# Patient Record
Sex: MALE | URBAN - METROPOLITAN AREA
[De-identification: names, ages, dates, MRNs, and addresses within clinical notes are randomized per-mention and may not be internally consistent; named-entity substitution may affect disease eponyms.]

---

## 2017-02-15 ENCOUNTER — HOSPITAL ENCOUNTER (OUTPATIENT)
Dept: BEHAVIORAL HEALTH | Facility: CLINIC | Age: 37
End: 2017-02-15
Attending: PSYCHIATRY & NEUROLOGY
Payer: MEDICAID

## 2017-02-15 ENCOUNTER — OFFICE VISIT (OUTPATIENT)
Dept: INTERPRETER SERVICES | Facility: CLINIC | Age: 37
End: 2017-02-15

## 2017-02-15 VITALS
WEIGHT: 183.6 LBS | HEIGHT: 66 IN | BODY MASS INDEX: 29.51 KG/M2 | HEART RATE: 64 BPM | DIASTOLIC BLOOD PRESSURE: 97 MMHG | SYSTOLIC BLOOD PRESSURE: 145 MMHG

## 2017-02-15 PROBLEM — F19.20 CHEMICAL DEPENDENCY (H): Status: ACTIVE | Noted: 2017-02-15

## 2017-02-15 PROCEDURE — H2035 A/D TX PROGRAM, PER HOUR: HCPCS | Mod: HQ

## 2017-02-15 PROCEDURE — T1013 SIGN LANG/ORAL INTERPRETER: HCPCS | Mod: U3

## 2017-02-15 PROCEDURE — 10020000 ZZH LODGING PLUS FACILITY CHARGE ADULT

## 2017-02-15 PROCEDURE — 40000007 ZZH STATISTIC ADULT CD FACE TO FACE-NO CHRG

## 2017-02-15 RX ORDER — LORATADINE 10 MG/1
10 TABLET ORAL DAILY PRN
COMMUNITY
End: 2017-03-11

## 2017-02-15 RX ORDER — AMOXICILLIN 250 MG
2 CAPSULE ORAL
COMMUNITY
End: 2017-03-11

## 2017-02-15 RX ORDER — MAGNESIUM HYDROXIDE/ALUMINUM HYDROXICE/SIMETHICONE 120; 1200; 1200 MG/30ML; MG/30ML; MG/30ML
30 SUSPENSION ORAL EVERY 6 HOURS PRN
COMMUNITY
End: 2017-03-11

## 2017-02-15 ASSESSMENT — ANXIETY QUESTIONNAIRES
4. TROUBLE RELAXING: NEARLY EVERY DAY
1. FEELING NERVOUS, ANXIOUS, OR ON EDGE: MORE THAN HALF THE DAYS
6. BECOMING EASILY ANNOYED OR IRRITABLE: MORE THAN HALF THE DAYS
7. FEELING AFRAID AS IF SOMETHING AWFUL MIGHT HAPPEN: NEARLY EVERY DAY
5. BEING SO RESTLESS THAT IT IS HARD TO SIT STILL: MORE THAN HALF THE DAYS
GAD7 TOTAL SCORE: 17
3. WORRYING TOO MUCH ABOUT DIFFERENT THINGS: NEARLY EVERY DAY
2. NOT BEING ABLE TO STOP OR CONTROL WORRYING: MORE THAN HALF THE DAYS

## 2017-02-15 ASSESSMENT — PAIN SCALES - GENERAL: PAINLEVEL: MODERATE PAIN (4)
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
## 2017-02-15 NOTE — PROGRESS NOTES
[address] [address]          Dear Willian Payne,    This is to verify that Kalia Calero was admitted for treatment of chemical dependency at [place], [place] on 2/15/2017.    This individual is currently participating in:   ______ Inpatient   __x___ [facility] (Residential Non-Medical)   ______ Day Outpatient   ______ Evening Outpatient       The client will participate in the program for about four weeks, depending on the needs of the client.  Treatment is A.A. based and helps clients to achieve a chemically free lifestyle through lectures, individual, family and group therapy.  He has been assigned to Raheem Estes and Homer Banuelos  /  Telephone: [phone] as his primary counselor.  If you have further questions, please contact us.    Respectfully,      Katherin May MA [facility]  CD Evaluation Counselor        (faxed 2/15/2017)
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
## 2017-02-15 NOTE — PROGRESS NOTES
Initial Services Plan        Before your first treatment group, please do the following    Immediate health & safety concerns: Look for sober housing and a supportive social network.  Obtain personal items (glasses, hearing aides, medicines, diabetes supplies, clothing, etc.).  Look for a support network (such as AA, NA, DBT group, a Episcopalian group, etc.)  Repeate BP / Pluse, Check in 24 to 48 hours    Suggestions for client during the time between intake & completion of treatment plan:  Tour your treatment center (unit or outpatient clinic).  Introduce yourself to the treatment group.  Spend time getting to know your peers.  Complete your psycho-social paperwork.  Complete the problem list for your treatment plan.  Start drug and alcohol use history.  Review your patient or client handbook.  Identify concerns about whom to ask for family week    Client issues to be addressed in the first treatment sessions:  Identify concerns about coming into treatment, i.e. fear of failing again, sharing a room in treatment      NATALEE Thompson  2/15/2017  10:17 AM

## 2017-02-15 NOTE — PROGRESS NOTES
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 keep doing this over and over again.\"    2B. How often do you spend more time than you planned using or use more than you planned? (DSM)     \"too many times. By the time I realize it, it's too late.\"    3. How important is using to your social connections? Do many of your family or friends use?     Half of his friends use. His family does not use.    4A. Are you currently in a significant relationship?     No    4C. Sexual Orientation:     Bisexual    5A. Who do you live with?      NA    5B. Tell me about their alcohol/drug use and mental health issues.     NA    5C. Are you concerned for your safety there? No    5D. Are you concerned about the safety of anyone else who lives with you? No    6A. Do you have children who live with you?     No    6B. Do you have children who do not live with you?     No    7A. Who supports you in making changes in your alcohol or drug use? What are they willing to do to support you? Who is upset or angry about you making changes in your alcohol or drug use? How big a problem is this for you?      \"my neighbors, they live in the other apartment. They do care a lot about me.\"     7B. This table is provided to record information about the person s relationships and " "available support It is not necessary to ask each item; only to get a comprehensive picture of their support system.  How often can you count on the following people when you need someone?   Partner / Spouse N/A   Parent(s)/Aunt(s)/Uncle(s)/Grandparents Always supportive   Sibling(s)/Cousin(s) Always supportive   Child(abraham) N/A   Other relative(s) N/A   Friend(s)/neighbor(s) Always supportive   Child(abraham) s father(s)/mother(s) N/A   Support group member(s) N/A   Community of afia members N/A   /counselor/therapist/healer N/A   Other (specify) N/A     8A. What is your current living situation?     Pt has been in halfway since 9/30/2016.  Prior to halfway he was living alone in [place], [place].    8B. What is your long term plan for where you will be living?     He plans on returning to his apartment in [place].    8C. Tell me about your living environment/neighborhood? Ask enough follow up questions to determine safety, criminal activity, availability of alcohol and drugs, supportive or antagonistic to the person making changes.      Prior to halfway, \"easy access, but I don't want to look for it.  There will always be people wanting me to do bad things.\"    9. Criminal justice history: Gather current/recent history and any significant history related to substance use--Arrests? Convictions? Circumstances? Alcohol or drug involvement? Sentences? Still on probation or parole? Expectations of the court? Current court order? Any sex offenses - lifetime? What level? (DSM)    First degree drug possession charge. He has not been sentenced yet.  He is working with a .    10. What obstacles exist to participating in treatment? (Time off work, childcare, funding, transportation, pending halfway time, living situation)     None    Dimension VI Ratings   Recovery environment - The placing authority must use the criteria in Dimension VI to determine a client s recovery environment.   RISK DESCRIPTIONS - " "Severity rating: 3 Client is not engaged in structured, meaningful activity and the client s peers, family, significant other, and living environment are unsupportive, or there is significant criminal justice system involvement.    REASONS SEVERITY WAS ASSIGNED - (What support does the person have for making changes? What structure/stability does the person have in his or her daily life that will increase the likelihood that changes can be sustained? What problems exist in the person s environment that will jeopardize getting/staying clean and sober?)     The patient was in long term for the past four months.  Prior to long term he was living alone in an apartment that he plans on returning to after treatment.  He denies any drug use in his neighborhood, he would go to other places to use drugs. The patient reported having relationship conflict with his neighbor and her  because of his drug use. He stated, \"they would always tell me not to (use drugs).\" The patient identified as being bisexual and he denied being in a romantic relationship at this time.  The patient reported having a history of legal issues, including a current charge of 1st degree drug possession. He is not on probation yet. His next court date is next week, he doesn't think he needs to appear, only his  needs to be there.  The patient reported that most of his use of meth had been done alone during the summer of 2016.  He stated about 2 years ago, his meth use was more social.  The patient is unemployed and he last worked in 2009.  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  The patient reported having some increased financial stress due to not working or receiving disability.  The patient lacks a current sober peer support network.         Client Choice/Exceptions   Would you like services specific to language, age, gender, culture, Adventism preference, race, ethnicity, sexual orientation or disability?  Yes - Lao " "language. \"I do understand English, but can't always express it.\"    What particular treatment choices and options would you like to have? IP    Do you have a preference for a particular treatment program? [facility]    Criteria for Diagnosis     Criteria for Diagnosis  DSM-5 Criteria for Substance Use Disorder  Instructions: Determine whether the client currently meets the criteria for Substance Use Disorder using the diagnostic criteria in the DSM-V pp.[phone]. Current means during the most recent 12 months outside a facility that controls access to substances    Category of Substance Severity (ICD-10 Code / DSM 5 Code)     Alcohol Use Disorder Severe  (10.20) (303.90)   Cannabis Use Disorder NA   Hallucinogen Use Disorder NA   Inhalant Use Disorder NA   Opioid Use Disorder NA   Sedative, Hypnotic, or Anxiolytic Use Disorder NA   Stimulant Related Disorder Severe   (F15.20) (304.40) Amphetamine type substance   Tobacco Use Disorder Mild    (Z72.0) (305.1)   Other (or unknown) Substance Use Disorder NA       Collateral Contact Summary   Number of contacts made: 1    Contact with referring person:  NA.    If court related records were reviewed, summarize here: NA    Information from collateral contacts supported/largely agreed with information from the client and associated risk ratings.      Rule 25 Assessment Summary and Plan   's Recommendation    1)  Complete a residential based or similar treatment program, such as [facility].   2)  Abstain from all mood-altering chemicals unless prescribed by a licensed provider.   3)  Attend, at minimum, 2 weekly 12-step support group meetings.     4)  Actively work with a male sponsor on a weekly basis.   5)  Follow all the recommendations of your treatment/medical providers.  6)  Remain law abiding and follow all recommendations of the Courts/PO.  7)  Patient would benefit from individual psychotherapy to address his depression.        Collateral " Contacts     Name:    Yoanna Gold   Relationship:    CD Evaluator    Phone Number:    [phone] Releases:    Yes     The CD evaluation form was reviewed and the information contained in the CD evaluation form supported the patient's account of his chemical use history and chemical use consequences.    ollateral Contacts      A problematic pattern of alcohol/drug use leading to clinically significant impairment or distress, as manifested by at least two of the following, occurring within a 12-month period:    Alcohol/drug is often taken in larger amounts or over a longer period than was intended.  There is a persistent desire or unsuccessful efforts to cut down or control alcohol/drug use  A great deal of time is spent in activities necessary to obtain alcohol, use alcohol, or recover from its effects.  Craving, or a strong desire or urge to use alcohol/drug  Recurrent alcohol/drug use resulting in a failure to fulfill major role obligations at work, school or home.  Continued alcohol use despite having persistent or recurrent social or interpersonal problems caused or exacerbated by the effects of alcohol/drug.  Important social, occupational, or recreational activities are given up or reduced because of alcohol/drug use.  Recurrent alcohol/drug use in situations in which it is physically hazardous.  Alcohol/drug use is continued despite knowledge of having a persistent or recurrent physical or psychological problem that is likely to have been caused or exacerbated by alcohol.  Tolerance, as defined by either of the following: A need for markedly increased amounts of alcohol/drug to achieve intoxication or desired effect.  Withdrawal, as manifested by either of the following: The characteristic withdrawal syndrome for alcohol/drug (refer to Criteria A and B of the criteria set for alcohol/drug withdrawal).      Specify if: In early remission:  After full criteria for alcohol/drug use disorder were previously  met, none of the criteria for alcohol/drug use disorder have been met for at least 3 months but for less than 12 months (with the exception that Criterion A4,  Craving or a strong desire or urge to use alcohol/drug  may be met).     In sustained remission:   After full criteria for alcohol use disorder were previously met, non of the criteria for alcohol/drug use disorder have been met at any time during a period of 12 months or longer (with the exception that Criterion A4,  Craving or strong desire or urge to use alcohol/drug  may be met).   Specify if:   This additional specifier is used if the individual is in an environment where access to alcohol is restricted.    Mild: Presence of 2-3 symptoms    Moderate: Presence of 4-5 symptoms    Severe: Presence of 6 or more symptoms
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
## 2017-02-16 ENCOUNTER — HOSPITAL ENCOUNTER (OUTPATIENT)
Dept: BEHAVIORAL HEALTH | Facility: CLINIC | Age: 37
End: 2017-02-16
Attending: PSYCHIATRY & NEUROLOGY
Payer: MEDICAID

## 2017-02-16 PROCEDURE — T1013 SIGN LANG/ORAL INTERPRETER: HCPCS | Mod: U3

## 2017-02-16 PROCEDURE — H2035 A/D TX PROGRAM, PER HOUR: HCPCS | Mod: HQ

## 2017-02-16 PROCEDURE — 10020000 ZZH LODGING PLUS FACILITY CHARGE ADULT

## 2017-02-16 ASSESSMENT — ANXIETY QUESTIONNAIRES: GAD7 TOTAL SCORE: 17

## 2017-02-16 ASSESSMENT — PATIENT HEALTH QUESTIONNAIRE - PHQ9: SUM OF ALL RESPONSES TO PHQ QUESTIONS 1-9: 17

## 2017-02-16 NOTE — PROGRESS NOTES
Intro  D) Pt introduced himself in group and stated that he decided to come to tx at this time because he has a problem with methamphetamines and he does not want his family to be ashamed of him. Pt also reported that he has legal issues that are motivating him to seek help. Pt stated that this was his first time in tx.   I) Staff facilitated group and both staff and fellow group members welcomed new pt.  A) Pt appears comfortable in the group setting and seemed willing to share about himself. Pt appears appropriate for this level of care at this time.   P) Meet with staff to develop tx plan.
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
## 2017-02-17 ENCOUNTER — HOSPITAL ENCOUNTER (OUTPATIENT)
Dept: BEHAVIORAL HEALTH | Facility: CLINIC | Age: 37
End: 2017-02-17
Attending: PSYCHIATRY & NEUROLOGY
Payer: MEDICAID

## 2017-02-17 ENCOUNTER — OFFICE VISIT (OUTPATIENT)
Dept: INTERPRETER SERVICES | Facility: CLINIC | Age: 37
End: 2017-02-17

## 2017-02-17 PROCEDURE — T1013 SIGN LANG/ORAL INTERPRETER: HCPCS | Mod: U3

## 2017-02-17 PROCEDURE — 10020000 ZZH LODGING PLUS FACILITY CHARGE ADULT

## 2017-02-17 PROCEDURE — H2035 A/D TX PROGRAM, PER HOUR: HCPCS | Mod: HQ

## 2017-02-17 NOTE — PROGRESS NOTES
This writer left message for Radha Slaughter at the [facility] ([phone]) stating that pt will have supply of medication Celexa during his stay at .  Pt was billed for medication (16.25$) and County may cover the expense.  Have asked the [facility] to work with pt to cover the medication expense.  Counselors notified

## 2017-02-17 NOTE — PROGRESS NOTES
Dr. Saavedra gave TO for the following medication:    Citalopram Hydrobromide (Celexa PO). Take 20 mg by mouth daily. Dispense 30 day supply. No Refills    Order called into [facility] with [facility] at 8:25 am

## 2017-02-18 ENCOUNTER — HOSPITAL ENCOUNTER (OUTPATIENT)
Dept: BEHAVIORAL HEALTH | Facility: CLINIC | Age: 37
End: 2017-02-18
Attending: PSYCHIATRY & NEUROLOGY
Payer: MEDICAID

## 2017-02-18 PROCEDURE — 10020000 ZZH LODGING PLUS FACILITY CHARGE ADULT

## 2017-02-18 PROCEDURE — H2035 A/D TX PROGRAM, PER HOUR: HCPCS | Mod: HQ

## 2017-02-19 ENCOUNTER — HOSPITAL ENCOUNTER (OUTPATIENT)
Dept: BEHAVIORAL HEALTH | Facility: CLINIC | Age: 37
End: 2017-02-19
Attending: PSYCHIATRY & NEUROLOGY
Payer: MEDICAID

## 2017-02-19 PROCEDURE — 10020000 ZZH LODGING PLUS FACILITY CHARGE ADULT

## 2017-02-19 PROCEDURE — H2035 A/D TX PROGRAM, PER HOUR: HCPCS | Mod: HQ

## 2017-02-20 ENCOUNTER — OFFICE VISIT (OUTPATIENT)
Dept: INTERPRETER SERVICES | Facility: CLINIC | Age: 37
End: 2017-02-20

## 2017-02-20 ENCOUNTER — HOSPITAL ENCOUNTER (OUTPATIENT)
Dept: BEHAVIORAL HEALTH | Facility: CLINIC | Age: 37
End: 2017-02-20
Attending: PSYCHIATRY & NEUROLOGY
Payer: MEDICAID

## 2017-02-20 PROCEDURE — 10020000 ZZH LODGING PLUS FACILITY CHARGE ADULT

## 2017-02-20 PROCEDURE — T1013 SIGN LANG/ORAL INTERPRETER: HCPCS | Mod: U3

## 2017-02-20 PROCEDURE — H2035 A/D TX PROGRAM, PER HOUR: HCPCS | Mod: HQ

## 2017-02-20 NOTE — PROGRESS NOTES
Acknowledgement of Current Treatment Plan       I have reviewed my treatment plan with my therapist / counselor on 2/20/17. I agree with the plan as it is written in the electronic health record.    Name Signature   Kalia Calero    Name of Therapist / Counselor    Carolann [email or web address]

## 2017-02-21 ENCOUNTER — OFFICE VISIT (OUTPATIENT)
Dept: INTERPRETER SERVICES | Facility: CLINIC | Age: 37
End: 2017-02-21

## 2017-02-21 ENCOUNTER — HOSPITAL ENCOUNTER (OUTPATIENT)
Dept: BEHAVIORAL HEALTH | Facility: CLINIC | Age: 37
End: 2017-02-21
Attending: PSYCHIATRY & NEUROLOGY
Payer: MEDICAID

## 2017-02-21 PROCEDURE — T1013 SIGN LANG/ORAL INTERPRETER: HCPCS | Mod: U3

## 2017-02-21 PROCEDURE — H2035 A/D TX PROGRAM, PER HOUR: HCPCS | Mod: HQ

## 2017-02-21 PROCEDURE — 10020000 ZZH LODGING PLUS FACILITY CHARGE ADULT

## 2017-02-21 NOTE — PROGRESS NOTES
Acknowledgement of Current Treatment Plan       I have reviewed my treatment plan with my therapist / counselor on 2/21/2017  I agree with the plan as it is written in the electronic health record.    Name Signature   Kalia Calero    Name of Therapist / Counselor    NATALEE Matamoros

## 2017-02-21 NOTE — PROGRESS NOTES
2/21/2017     This writer created a new treatment plan for the patient because the plan previously created did not meet the patient's Citizen of [place] speaking/reading needs. The new plan has been presented to the patient with this writer and an . Pt verbalized understanding that he does not need to complete the previous plan, only the new one.     NATALEE Matamoros

## 2017-02-22 ENCOUNTER — OFFICE VISIT (OUTPATIENT)
Dept: INTERPRETER SERVICES | Facility: CLINIC | Age: 37
End: 2017-02-22

## 2017-02-22 ENCOUNTER — HOSPITAL ENCOUNTER (OUTPATIENT)
Dept: BEHAVIORAL HEALTH | Facility: CLINIC | Age: 37
End: 2017-02-22
Attending: PSYCHIATRY & NEUROLOGY
Payer: MEDICAID

## 2017-02-22 PROCEDURE — H2035 A/D TX PROGRAM, PER HOUR: HCPCS | Mod: HQ

## 2017-02-22 PROCEDURE — T1013 SIGN LANG/ORAL INTERPRETER: HCPCS | Mod: U3

## 2017-02-22 PROCEDURE — 10020000 ZZH LODGING PLUS FACILITY CHARGE ADULT
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
## 2017-02-23 ENCOUNTER — OFFICE VISIT (OUTPATIENT)
Dept: INTERPRETER SERVICES | Facility: CLINIC | Age: 37
End: 2017-02-23

## 2017-02-23 ENCOUNTER — HOSPITAL ENCOUNTER (OUTPATIENT)
Dept: BEHAVIORAL HEALTH | Facility: CLINIC | Age: 37
End: 2017-02-23
Attending: PSYCHIATRY & NEUROLOGY
Payer: MEDICAID

## 2017-02-23 PROCEDURE — T1013 SIGN LANG/ORAL INTERPRETER: HCPCS | Mod: U3

## 2017-02-23 PROCEDURE — H2035 A/D TX PROGRAM, PER HOUR: HCPCS | Mod: HQ

## 2017-02-23 PROCEDURE — 10020000 ZZH LODGING PLUS FACILITY CHARGE ADULT

## 2017-02-23 NOTE — PROGRESS NOTES
2/23/2017   D: Pt read an article on coping with stress and created a list of 10 ways to cope with stress. Pt shared this assignment during group therapy on 02/22/17.  I: Counselor facilitated group, interpreters translated, and pt's peers provided feedback.  A: Pt appeared to put thought into his assignment and was open for feedback.  P: Pt will continue to work on goals/assignments on his treatment plan.    NATALEE Matamoros

## 2017-02-24 ENCOUNTER — HOSPITAL ENCOUNTER (OUTPATIENT)
Dept: BEHAVIORAL HEALTH | Facility: CLINIC | Age: 37
End: 2017-02-24
Attending: PSYCHIATRY & NEUROLOGY
Payer: MEDICAID

## 2017-02-24 PROCEDURE — T1013 SIGN LANG/ORAL INTERPRETER: HCPCS | Mod: U3

## 2017-02-24 PROCEDURE — 10020000 ZZH LODGING PLUS FACILITY CHARGE ADULT

## 2017-02-24 PROCEDURE — H2035 A/D TX PROGRAM, PER HOUR: HCPCS | Mod: HQ

## 2017-02-25 ENCOUNTER — HOSPITAL ENCOUNTER (OUTPATIENT)
Dept: BEHAVIORAL HEALTH | Facility: CLINIC | Age: 37
End: 2017-02-25
Attending: PSYCHIATRY & NEUROLOGY
Payer: MEDICAID

## 2017-02-25 PROCEDURE — 10020000 ZZH LODGING PLUS FACILITY CHARGE ADULT

## 2017-02-25 PROCEDURE — H2035 A/D TX PROGRAM, PER HOUR: HCPCS | Mod: HQ

## 2017-02-26 ENCOUNTER — HOSPITAL ENCOUNTER (OUTPATIENT)
Dept: BEHAVIORAL HEALTH | Facility: CLINIC | Age: 37
End: 2017-02-26
Attending: PSYCHIATRY & NEUROLOGY
Payer: MEDICAID

## 2017-02-26 PROCEDURE — H2035 A/D TX PROGRAM, PER HOUR: HCPCS | Mod: HQ

## 2017-02-26 PROCEDURE — 10020000 ZZH LODGING PLUS FACILITY CHARGE ADULT

## 2017-02-26 PROCEDURE — T1013 SIGN LANG/ORAL INTERPRETER: HCPCS | Mod: U3

## 2017-02-27 ENCOUNTER — OFFICE VISIT (OUTPATIENT)
Dept: INTERPRETER SERVICES | Facility: CLINIC | Age: 37
End: 2017-02-27

## 2017-02-27 ENCOUNTER — HOSPITAL ENCOUNTER (OUTPATIENT)
Dept: BEHAVIORAL HEALTH | Facility: CLINIC | Age: 37
End: 2017-02-27
Attending: PSYCHIATRY & NEUROLOGY
Payer: MEDICAID

## 2017-02-27 PROCEDURE — H2035 A/D TX PROGRAM, PER HOUR: HCPCS | Mod: HQ

## 2017-02-27 PROCEDURE — T1013 SIGN LANG/ORAL INTERPRETER: HCPCS | Mod: U3

## 2017-02-27 PROCEDURE — 10020000 ZZH LODGING PLUS FACILITY CHARGE ADULT

## 2017-02-28 ENCOUNTER — OFFICE VISIT (OUTPATIENT)
Dept: INTERPRETER SERVICES | Facility: CLINIC | Age: 37
End: 2017-02-28

## 2017-02-28 ENCOUNTER — HOSPITAL ENCOUNTER (OUTPATIENT)
Dept: BEHAVIORAL HEALTH | Facility: CLINIC | Age: 37
End: 2017-02-28
Attending: PSYCHIATRY & NEUROLOGY
Payer: MEDICAID

## 2017-02-28 PROCEDURE — H2035 A/D TX PROGRAM, PER HOUR: HCPCS | Mod: HQ

## 2017-02-28 PROCEDURE — T1013 SIGN LANG/ORAL INTERPRETER: HCPCS | Mod: U3

## 2017-02-28 PROCEDURE — 10020000 ZZH LODGING PLUS FACILITY CHARGE ADULT

## 2017-02-28 NOTE — PROGRESS NOTES
"D-Patient completed his \"10 Things He Could do for Depression\" assignment and presented in group. Patient made a detailed list of ten distinct things he could do in order to either avoid depression or limit its power over him.  I-Group therapy-Counselor reviewed group norms, facilitated group discussion and conducted feedback session.  A-Patient appears to be very clear in the thinking and behavior he must employ in order to better manage and keep depression from dominating his life.  P-Continue with treatment and treatment plan objectives.  "

## 2017-03-01 ENCOUNTER — HOSPITAL ENCOUNTER (OUTPATIENT)
Dept: BEHAVIORAL HEALTH | Facility: CLINIC | Age: 37
End: 2017-03-01
Attending: PSYCHIATRY & NEUROLOGY
Payer: MEDICAID

## 2017-03-01 ENCOUNTER — OFFICE VISIT (OUTPATIENT)
Dept: INTERPRETER SERVICES | Facility: CLINIC | Age: 37
End: 2017-03-01

## 2017-03-01 PROCEDURE — 10020000 ZZH LODGING PLUS FACILITY CHARGE ADULT
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
## 2017-03-02 ENCOUNTER — OFFICE VISIT (OUTPATIENT)
Dept: INTERPRETER SERVICES | Facility: CLINIC | Age: 37
End: 2017-03-02

## 2017-03-02 ENCOUNTER — HOSPITAL ENCOUNTER (OUTPATIENT)
Dept: BEHAVIORAL HEALTH | Facility: CLINIC | Age: 37
End: 2017-03-02
Attending: PSYCHIATRY & NEUROLOGY
Payer: MEDICAID

## 2017-03-02 PROCEDURE — H2035 A/D TX PROGRAM, PER HOUR: HCPCS | Mod: HQ

## 2017-03-02 PROCEDURE — 10020000 ZZH LODGING PLUS FACILITY CHARGE ADULT

## 2017-03-02 NOTE — PROGRESS NOTES
3/2/2017   D: Pt completed an assignment where he had to write a poem about his hope for recovery. Pt shared this assignment during group therapy today.  I: Counselor facilitated group therapy and pt's peers provided feedback.  A: Pt wrote about the things he is grateful for, and his hopes for recovery. The tone of the poem was very positive.  P: Pt will continue to work on goals/assignments on his treatment plan.     NATALEE Matamoros

## 2017-03-03 ENCOUNTER — HOSPITAL ENCOUNTER (OUTPATIENT)
Dept: BEHAVIORAL HEALTH | Facility: CLINIC | Age: 37
End: 2017-03-03
Attending: PSYCHIATRY & NEUROLOGY
Payer: MEDICAID

## 2017-03-03 ENCOUNTER — OFFICE VISIT (OUTPATIENT)
Dept: INTERPRETER SERVICES | Facility: CLINIC | Age: 37
End: 2017-03-03

## 2017-03-03 PROCEDURE — 10020000 ZZH LODGING PLUS FACILITY CHARGE ADULT

## 2017-03-03 PROCEDURE — H2035 A/D TX PROGRAM, PER HOUR: HCPCS | Mod: HQ

## 2017-03-04 ENCOUNTER — HOSPITAL ENCOUNTER (OUTPATIENT)
Dept: BEHAVIORAL HEALTH | Facility: CLINIC | Age: 37
End: 2017-03-04
Attending: PSYCHIATRY & NEUROLOGY
Payer: MEDICAID

## 2017-03-04 PROCEDURE — 10020000 ZZH LODGING PLUS FACILITY CHARGE ADULT

## 2017-03-04 PROCEDURE — H2035 A/D TX PROGRAM, PER HOUR: HCPCS | Mod: HQ

## 2017-03-05 ENCOUNTER — HOSPITAL ENCOUNTER (OUTPATIENT)
Dept: BEHAVIORAL HEALTH | Facility: CLINIC | Age: 37
End: 2017-03-05
Attending: PSYCHIATRY & NEUROLOGY
Payer: MEDICAID

## 2017-03-05 PROCEDURE — 10020000 ZZH LODGING PLUS FACILITY CHARGE ADULT

## 2017-03-05 PROCEDURE — H2035 A/D TX PROGRAM, PER HOUR: HCPCS | Mod: HQ

## 2017-03-06 ENCOUNTER — OFFICE VISIT (OUTPATIENT)
Dept: INTERPRETER SERVICES | Facility: CLINIC | Age: 37
End: 2017-03-06

## 2017-03-06 ENCOUNTER — HOSPITAL ENCOUNTER (OUTPATIENT)
Dept: BEHAVIORAL HEALTH | Facility: CLINIC | Age: 37
End: 2017-03-06
Attending: PSYCHIATRY & NEUROLOGY
Payer: MEDICAID

## 2017-03-06 PROCEDURE — 10020000 ZZH LODGING PLUS FACILITY CHARGE ADULT

## 2017-03-06 PROCEDURE — H2035 A/D TX PROGRAM, PER HOUR: HCPCS | Mod: HQ

## 2017-03-06 NOTE — PROGRESS NOTES
"D-Patient completed his \"15 People I Need Things From to Stay Sober\" and presented in group. Patient discussed who the 15 people where and what specifically he needs from each of them in order to help him live a life of sobriety.  I-Group therapy-Counselor reviewed group norms, facilitated group discussion and feedback session with peers.  A-Patient appears to have a greater level of awareness regarding the resources he has in his life that will help him stay sober.  P-Continue with treatment plan and treatment plan objectives.  "

## 2017-03-07 ENCOUNTER — OFFICE VISIT (OUTPATIENT)
Dept: INTERPRETER SERVICES | Facility: CLINIC | Age: 37
End: 2017-03-07

## 2017-03-07 ENCOUNTER — HOSPITAL ENCOUNTER (OUTPATIENT)
Dept: BEHAVIORAL HEALTH | Facility: CLINIC | Age: 37
End: 2017-03-07
Attending: PSYCHIATRY & NEUROLOGY
Payer: MEDICAID

## 2017-03-07 PROCEDURE — 10020000 ZZH LODGING PLUS FACILITY CHARGE ADULT

## 2017-03-07 PROCEDURE — H2035 A/D TX PROGRAM, PER HOUR: HCPCS | Mod: HQ

## 2017-03-08 ENCOUNTER — HOSPITAL ENCOUNTER (OUTPATIENT)
Dept: BEHAVIORAL HEALTH | Facility: CLINIC | Age: 37
End: 2017-03-08
Attending: PSYCHIATRY & NEUROLOGY
Payer: MEDICAID

## 2017-03-08 PROCEDURE — H2035 A/D TX PROGRAM, PER HOUR: HCPCS | Mod: HQ

## 2017-03-08 PROCEDURE — 10020000 ZZH LODGING PLUS FACILITY CHARGE ADULT

## 2017-03-08 NOTE — PROGRESS NOTES
"Patient:  Kalia Calero            Adult CD Progress Note and Treatment Plan Review     Attendance  Please refer to OP BEH CD Adult Attendance Record Documentation Flowsheet    Support group attended this week: Yes    Reporting sobriety: Yes    Treatment Plan     Treatment Plan Review competed on: 3/8/17      Client preferred learning style: Hands On    Staff Members contributing: NATALEE Matamoros, Emili Humphrey, NATALEE, Carolann Canales, ADC-T                      Received Supervision: No    Client: Contributed to goals and plans/    Client received copy of plan/revised plan: Yes    Client agrees with plan/revised plan: Yes    Changes to Treatment Plan: No    New Goals added since last review: None    Goals worked on since last review:  Depression, Relapse Prevention, Recovery, How bad was it    Strategies effective: Yes    Strategies need these changes: None    ASAM Risk Rating:    Dimension 1-0 Patient reports his last use date as August, 2016. Patient reports no current withdrawal symptomology.    Dimension 2-1 Patient reports no current biomedical issues.    Dimension 3-2 Patient reports a depression and anxiety mental health diagnosis. Patient completed his \"Readings on Depression\" assignment and presented in group. Patient reports no suicidal ideation at this time.    Dimension 4-1 Patient completed his \"Poem That Describes Recovery\" assignment and presented in group. Patient will be working on his \"5 Years Sober vs 5 Years Using\" assignment and will present in group. Patient appears to be a very motivated participant in group discussion and offers thoughtful and constructive feedback to his peers during group therapy feedback sessions.    Dimension 5-4 Patient reports an 8 on a scale of 1(low) and 10(high) regarding his feelings about treatment. Patient participated in the Relapse Prevention Workshop. Patient will be working on his Relapse Prevention Plan and will present in group.     " Dimension 6-3 Patient reports that he still plans to return to [place] and will continue to work with counselors to secure outpatient options in his area. Patient attended 3 support group meetings this past week, is working on his list of meetings to attend after treatment and obtaining a temporary sponsor.    Any changes in Vulnerable Adult Status?  No  If yes, add to treatment plan and individual abuse prevention plan.    Family Involvement:   No family in [facility]    Data:   Offered feedback, good insight, client did actively participate.    Intervention:   Aftercare planning  Cognitive Behavioral Therapy  Education  Emotional Management  Group Feedback  Motivational Enhancement Therapy  Relapse Prevention  Twelve Step Facilitation  Mental Health Educaton    Assessment:   Stages of change model  Preparation/Determination    Appears/Sounds:  Cooperative  Motivated  Engaged      Plan:  Focus on recovery environment  Monitor Emotional/physical health  Continue to work on treatment plan objectives      Carolann Canales, ADC-T

## 2017-03-09 ENCOUNTER — HOSPITAL ENCOUNTER (OUTPATIENT)
Dept: BEHAVIORAL HEALTH | Facility: CLINIC | Age: 37
End: 2017-03-09
Attending: PSYCHIATRY & NEUROLOGY
Payer: MEDICAID

## 2017-03-09 ENCOUNTER — OFFICE VISIT (OUTPATIENT)
Dept: INTERPRETER SERVICES | Facility: CLINIC | Age: 37
End: 2017-03-09

## 2017-03-09 PROCEDURE — 10020000 ZZH LODGING PLUS FACILITY CHARGE ADULT

## 2017-03-09 PROCEDURE — H2035 A/D TX PROGRAM, PER HOUR: HCPCS | Mod: HQ
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
## 2017-03-10 ENCOUNTER — HOSPITAL ENCOUNTER (OUTPATIENT)
Dept: BEHAVIORAL HEALTH | Facility: CLINIC | Age: 37
End: 2017-03-10
Attending: PSYCHIATRY & NEUROLOGY
Payer: MEDICAID

## 2017-03-10 PROCEDURE — H2035 A/D TX PROGRAM, PER HOUR: HCPCS | Mod: HQ

## 2017-03-10 PROCEDURE — 10020000 ZZH LODGING PLUS FACILITY CHARGE ADULT

## 2017-03-10 NOTE — PROGRESS NOTES
3/10/2017   D: Pt completed an assignment where he had to write about what his life would look like in 5 years if he stays sober and what his life would look like in 5 years if he went back to using.   I: Counselor facilitated group therapy and pt's peers provided feedback.  A: Pt appeared to put a lot of thought into this assignment. Pt's assignment appeared realistic in what would happen.  P: Pt will finish his remaining assignments and goals on his treatment plan before discharge on 03/15/17.    NATALEE Matamoros

## 2017-03-11 ENCOUNTER — HOSPITAL ENCOUNTER (OUTPATIENT)
Dept: BEHAVIORAL HEALTH | Facility: CLINIC | Age: 37
End: 2017-03-11
Attending: PSYCHIATRY & NEUROLOGY
Payer: MEDICAID

## 2017-03-11 PROCEDURE — H2035 A/D TX PROGRAM, PER HOUR: HCPCS | Mod: HQ

## 2017-03-11 PROCEDURE — 10020000 ZZH LODGING PLUS FACILITY CHARGE ADULT

## 2017-03-11 NOTE — PROGRESS NOTES
Nursing Discharge Planning Meeting    Writer completed discharge planning meeting with patient. Discharge is planned for Wednesday, March 15th.    Discussed appropriate follow up care to obtain medication refills. Patient given a copy of his current medication for reference. Patient verbalized an understanding of post-discharge follow up plan to receive reimbursement through the [facility] for his antidepressant medication. Patient has contact information for the [facility] resources.   Patient does not have medication insurance or a PCP at this time but states he knows where/how to receive medical services if needed.     Continue to support patient in discharge planning as needed to assure appropriate continuity of care.

## 2017-03-12 ENCOUNTER — HOSPITAL ENCOUNTER (OUTPATIENT)
Dept: BEHAVIORAL HEALTH | Facility: CLINIC | Age: 37
End: 2017-03-12
Attending: PSYCHIATRY & NEUROLOGY
Payer: MEDICAID

## 2017-03-12 ENCOUNTER — OFFICE VISIT (OUTPATIENT)
Dept: INTERPRETER SERVICES | Facility: CLINIC | Age: 37
End: 2017-03-12

## 2017-03-12 PROCEDURE — 10020000 ZZH LODGING PLUS FACILITY CHARGE ADULT

## 2017-03-12 PROCEDURE — H2035 A/D TX PROGRAM, PER HOUR: HCPCS | Mod: HQ

## 2017-03-13 ENCOUNTER — HOSPITAL ENCOUNTER (OUTPATIENT)
Dept: BEHAVIORAL HEALTH | Facility: CLINIC | Age: 37
End: 2017-03-13
Attending: PSYCHIATRY & NEUROLOGY
Payer: MEDICAID

## 2017-03-13 PROCEDURE — H2035 A/D TX PROGRAM, PER HOUR: HCPCS | Mod: HQ

## 2017-03-13 PROCEDURE — 10020000 ZZH LODGING PLUS FACILITY CHARGE ADULT

## 2017-03-13 NOTE — PROGRESS NOTES
"D-Patient completed his \"Relapse Prevention Plan\" assignment and presented in group. Patient discussed the body of information he's learned in treatment and how this will be turned into action that will keep him sober, such as having a sponsor, going to meetings, and filling his unstructured time with sober fun activities.  I-Group therapy-Counselor reviewed group norms, facilitated group discussion and conducted feedback session.  A-Patient appears to have a better understanding and awareness of what his life after treatment at  with have to be like in order to stay sober.  P-Continue with treatment and treatment plan objectives.  "

## 2017-03-14 ENCOUNTER — OFFICE VISIT (OUTPATIENT)
Dept: INTERPRETER SERVICES | Facility: CLINIC | Age: 37
End: 2017-03-14

## 2017-03-14 ENCOUNTER — HOSPITAL ENCOUNTER (OUTPATIENT)
Dept: BEHAVIORAL HEALTH | Facility: CLINIC | Age: 37
End: 2017-03-14
Attending: PSYCHIATRY & NEUROLOGY
Payer: MEDICAID

## 2017-03-14 PROCEDURE — 10020000 ZZH LODGING PLUS FACILITY CHARGE ADULT

## 2017-03-14 PROCEDURE — H2035 A/D TX PROGRAM, PER HOUR: HCPCS | Mod: HQ

## 2017-03-14 NOTE — PROGRESS NOTES
[facility] Discharge Summary/Instructions     Patient: Kalia Calero  MRN: [id number]   : 1980 Age: 36 year old Sex: male   Focus of Treatment / Discharge Recommendations  Personal Safety/ Management of Symptoms    * Follow your safety plan.  Report increased symptoms to your care team and /or go to the nearest Emergency Department.   Call crisis lines as needed:      [facility] [phone]                [facility] [phone]    [facility] [phone]               Crisis Connection [phone]    [facility] [phone]              [facility] [phone]    [facility] [phone]          National Suicide Prevention [phone]    [facility] [phone]            Suicide Prevention [phone]    [facility] [phone]  Abstinence/Relapse Prevention  * Take all medicines as directed.  Carry a current list of medicines with you.  * Use coping skills: nutrition, rest, exercise, spirituality, journal, meditation, engage in activities you enjoy, seek sober support  * Do not use illicit (street) drugs, controlled substances (narcotics) or alcohol.    Develop/Improve Independent Living/Socialization Skills: Ensure living environment is conducive to recovery    Community Resources/Supports:     [place] Speaking Discussion:   [place], [place]: Meetings are on Wednesday at 7:00 PM.     [facility]: Free Interpretor Services   : [phone]    [facility] Intergroup Alcoholics Anonymous:  [phone] ( 24 hours a day)    [place]  Alcoholics Anonymous : [phone]    Narcotics Anonymous : [address] [place] [address] ( [phone])    [place] [facility]: Recovery resources.  [phone]    Access chats, online meetings, discussions and healthy check-in activities any day, any time, from anywhere. Participate as anonymously as you like. In order to access this resource,  Go to:  [email or web address]   Click on the In recovery tab. Then click on Social Community. Click on The daily Pledge: people helping people 24/7 to join.    Discharge Planning: Maintain abstinence from all mood altering substances including alcohol, attend 1+ AA/NA or other support group meetings per week ([place] Speaking Discussion) , maintain contact with a program sponsor,  enter 1.1 therapy, follow all recommendations of the courts/remain law abiding, maintain good physical and mental health care.    Follow up with psychiatrist / Main caregiver: NextTBD                                                                     Follow up with your therapist: Referrals provided  Next visit: TBD  Go to group therapy and / or support groups at: [place] [employer] [facility] : Wednesday evenings at 7:00 PM : [address] [place] [address]  See your medical doctor about:  Ongoing physical health care  Client Signature:_______________________   Date / Time:___________  Staff Signature:________________________   Date / Time:________

## 2017-03-14 NOTE — PROGRESS NOTES
[address] [place] [address]        Kalia Calero, 1980, was admitted for evaluation/treatment of chemical dependency at [facility].  This person took part in these program(s):    ______ The Inpatient Program   ______ The Outpatient Program   ___X_ The [facility]   ______ [facility] Outpatient       Date admitted: 2/15/17  Date discharged: 3/15/17    Type of discharge:   ___X___ Satisfactory - completed evaluation / treatment   ______ Discharged without completing   ______ Behavioral discharge   ______ Transferred to another chemical dependency program   ______ Transferred to another type of service   ______ Left against medical advice (AMA) / [facility]       Comments: Patient actively participated in all programming, group therapy and educational programming. He completed all treatment planned goals, putting much effort into them.  Patient is recommended to attend support group meetings through his community ([place]ian Speaking Discussion) and to follow up with [facility] for one to one therapy.       Counselor: NATALEE Lizarraga                       Date: 3/14/2017             Time: 11:34 AM

## 2017-03-15 NOTE — PROGRESS NOTES
An error was made on comprehensive  assessment summary as to patient's reported last use date. Pt's last use date was initially reported as 8/2016 on 2/15/17 on the CD assessment by Kerry Banks. He then reported his last date as 10/01/17 in the Comprehensive Assessment Summary interview. After this, he continued to refer to his last use date as 8/2016. This was corrected when discovered in the review of documents on 3/15/17.  Kalia has a history of TBI and may have been confused at his first interview with the counselor and .
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
## 2017-03-16 NOTE — PROGRESS NOTES
At the request of the patient, the patients discharge summary was faxed to his  on this date. A copy was also faxed to his  in [facility] per 's request.